# Patient Record
Sex: FEMALE | Race: WHITE | Employment: FULL TIME | URBAN - METROPOLITAN AREA
[De-identification: names, ages, dates, MRNs, and addresses within clinical notes are randomized per-mention and may not be internally consistent; named-entity substitution may affect disease eponyms.]

---

## 2023-07-06 DIAGNOSIS — I10 HYPERTENSION, UNSPECIFIED TYPE: ICD-10-CM

## 2023-07-06 DIAGNOSIS — E78.5 HYPERLIPIDEMIA, UNSPECIFIED HYPERLIPIDEMIA TYPE: ICD-10-CM

## 2023-07-06 RX ORDER — ATORVASTATIN CALCIUM 10 MG/1
TABLET, FILM COATED ORAL
COMMUNITY
End: 2023-07-06 | Stop reason: SDUPTHER

## 2023-07-06 RX ORDER — METOPROLOL SUCCINATE 100 MG/1
100 TABLET, EXTENDED RELEASE ORAL DAILY
Qty: 90 TABLET | Refills: 1 | Status: SHIPPED | OUTPATIENT
Start: 2023-07-06 | End: 2023-10-25 | Stop reason: SDUPTHER

## 2023-07-06 RX ORDER — ATORVASTATIN CALCIUM 10 MG/1
10 TABLET, FILM COATED ORAL DAILY
Qty: 90 TABLET | Refills: 1 | Status: SHIPPED | OUTPATIENT
Start: 2023-07-06 | End: 2023-10-25 | Stop reason: SDUPTHER

## 2023-07-06 RX ORDER — METOPROLOL SUCCINATE 100 MG/1
100 TABLET, EXTENDED RELEASE ORAL DAILY
COMMUNITY
End: 2023-07-06 | Stop reason: SDUPTHER

## 2023-10-25 ENCOUNTER — OFFICE VISIT (OUTPATIENT)
Dept: PRIMARY CARE | Facility: CLINIC | Age: 68
End: 2023-10-25
Payer: MEDICARE

## 2023-10-25 ENCOUNTER — LAB (OUTPATIENT)
Dept: LAB | Facility: LAB | Age: 68
End: 2023-10-25
Payer: MEDICARE

## 2023-10-25 VITALS
TEMPERATURE: 97.6 F | HEART RATE: 65 BPM | BODY MASS INDEX: 25.56 KG/M2 | DIASTOLIC BLOOD PRESSURE: 72 MMHG | OXYGEN SATURATION: 96 % | HEIGHT: 56 IN | WEIGHT: 113.6 LBS | SYSTOLIC BLOOD PRESSURE: 120 MMHG

## 2023-10-25 DIAGNOSIS — E78.2 MIXED HYPERLIPIDEMIA: ICD-10-CM

## 2023-10-25 DIAGNOSIS — Z00.00 HEALTH MAINTENANCE EXAMINATION: ICD-10-CM

## 2023-10-25 DIAGNOSIS — Z00.00 MEDICARE ANNUAL WELLNESS VISIT, SUBSEQUENT: Primary | ICD-10-CM

## 2023-10-25 DIAGNOSIS — Z78.0 ASYMPTOMATIC POSTMENOPAUSAL STATE: ICD-10-CM

## 2023-10-25 DIAGNOSIS — E78.5 HYPERLIPIDEMIA, UNSPECIFIED HYPERLIPIDEMIA TYPE: ICD-10-CM

## 2023-10-25 DIAGNOSIS — I10 PRIMARY HYPERTENSION: ICD-10-CM

## 2023-10-25 DIAGNOSIS — I10 BENIGN ESSENTIAL HYPERTENSION: ICD-10-CM

## 2023-10-25 DIAGNOSIS — F17.210 CIGARETTE NICOTINE DEPENDENCE WITHOUT COMPLICATION: ICD-10-CM

## 2023-10-25 LAB
ALBUMIN SERPL BCP-MCNC: 4.4 G/DL (ref 3.4–5)
ALP SERPL-CCNC: 90 U/L (ref 33–136)
ALT SERPL W P-5'-P-CCNC: 15 U/L (ref 7–45)
ANION GAP SERPL CALC-SCNC: 13 MMOL/L (ref 10–20)
AST SERPL W P-5'-P-CCNC: 19 U/L (ref 9–39)
BILIRUB SERPL-MCNC: 0.6 MG/DL (ref 0–1.2)
BUN SERPL-MCNC: 11 MG/DL (ref 6–23)
CALCIUM SERPL-MCNC: 9.6 MG/DL (ref 8.6–10.3)
CHLORIDE SERPL-SCNC: 102 MMOL/L (ref 98–107)
CHOLEST SERPL-MCNC: 160 MG/DL (ref 0–199)
CHOLESTEROL/HDL RATIO: 3.1
CO2 SERPL-SCNC: 26 MMOL/L (ref 21–32)
CREAT SERPL-MCNC: 0.64 MG/DL (ref 0.5–1.05)
ERYTHROCYTE [DISTWIDTH] IN BLOOD BY AUTOMATED COUNT: 13.3 % (ref 11.5–14.5)
GFR SERPL CREATININE-BSD FRML MDRD: >90 ML/MIN/1.73M*2
GLUCOSE SERPL-MCNC: 89 MG/DL (ref 74–99)
HCT VFR BLD AUTO: 43.4 % (ref 36–46)
HDLC SERPL-MCNC: 52.1 MG/DL
HGB BLD-MCNC: 14.1 G/DL (ref 12–16)
LDLC SERPL CALC-MCNC: 86 MG/DL
MCH RBC QN AUTO: 29.4 PG (ref 26–34)
MCHC RBC AUTO-ENTMCNC: 32.5 G/DL (ref 32–36)
MCV RBC AUTO: 91 FL (ref 80–100)
NON HDL CHOLESTEROL: 108 MG/DL (ref 0–149)
NRBC BLD-RTO: 0 /100 WBCS (ref 0–0)
PLATELET # BLD AUTO: 383 X10*3/UL (ref 150–450)
PMV BLD AUTO: 9.3 FL (ref 7.5–11.5)
POTASSIUM SERPL-SCNC: 4.6 MMOL/L (ref 3.5–5.3)
PROT SERPL-MCNC: 6.7 G/DL (ref 6.4–8.2)
RBC # BLD AUTO: 4.79 X10*6/UL (ref 4–5.2)
SODIUM SERPL-SCNC: 136 MMOL/L (ref 136–145)
TRIGL SERPL-MCNC: 112 MG/DL (ref 0–149)
VLDL: 22 MG/DL (ref 0–40)
WBC # BLD AUTO: 11.5 X10*3/UL (ref 4.4–11.3)

## 2023-10-25 PROCEDURE — 36415 COLL VENOUS BLD VENIPUNCTURE: CPT

## 2023-10-25 PROCEDURE — 1159F MED LIST DOCD IN RCRD: CPT | Performed by: STUDENT IN AN ORGANIZED HEALTH CARE EDUCATION/TRAINING PROGRAM

## 2023-10-25 PROCEDURE — 80061 LIPID PANEL: CPT

## 2023-10-25 PROCEDURE — 99213 OFFICE O/P EST LOW 20 MIN: CPT | Performed by: STUDENT IN AN ORGANIZED HEALTH CARE EDUCATION/TRAINING PROGRAM

## 2023-10-25 PROCEDURE — 1160F RVW MEDS BY RX/DR IN RCRD: CPT | Performed by: STUDENT IN AN ORGANIZED HEALTH CARE EDUCATION/TRAINING PROGRAM

## 2023-10-25 PROCEDURE — G0439 PPPS, SUBSEQ VISIT: HCPCS | Performed by: STUDENT IN AN ORGANIZED HEALTH CARE EDUCATION/TRAINING PROGRAM

## 2023-10-25 PROCEDURE — 85027 COMPLETE CBC AUTOMATED: CPT

## 2023-10-25 PROCEDURE — 80053 COMPREHEN METABOLIC PANEL: CPT

## 2023-10-25 PROCEDURE — 1170F FXNL STATUS ASSESSED: CPT | Performed by: STUDENT IN AN ORGANIZED HEALTH CARE EDUCATION/TRAINING PROGRAM

## 2023-10-25 PROCEDURE — 99397 PER PM REEVAL EST PAT 65+ YR: CPT | Performed by: STUDENT IN AN ORGANIZED HEALTH CARE EDUCATION/TRAINING PROGRAM

## 2023-10-25 PROCEDURE — 4004F PT TOBACCO SCREEN RCVD TLK: CPT | Performed by: STUDENT IN AN ORGANIZED HEALTH CARE EDUCATION/TRAINING PROGRAM

## 2023-10-25 PROCEDURE — 3074F SYST BP LT 130 MM HG: CPT | Performed by: STUDENT IN AN ORGANIZED HEALTH CARE EDUCATION/TRAINING PROGRAM

## 2023-10-25 PROCEDURE — 1126F AMNT PAIN NOTED NONE PRSNT: CPT | Performed by: STUDENT IN AN ORGANIZED HEALTH CARE EDUCATION/TRAINING PROGRAM

## 2023-10-25 PROCEDURE — 99406 BEHAV CHNG SMOKING 3-10 MIN: CPT | Performed by: STUDENT IN AN ORGANIZED HEALTH CARE EDUCATION/TRAINING PROGRAM

## 2023-10-25 PROCEDURE — 3078F DIAST BP <80 MM HG: CPT | Performed by: STUDENT IN AN ORGANIZED HEALTH CARE EDUCATION/TRAINING PROGRAM

## 2023-10-25 RX ORDER — METOPROLOL SUCCINATE 100 MG/1
100 TABLET, EXTENDED RELEASE ORAL DAILY
Qty: 90 TABLET | Refills: 1 | Status: SHIPPED | OUTPATIENT
Start: 2023-10-25 | End: 2024-04-22

## 2023-10-25 RX ORDER — ATORVASTATIN CALCIUM 10 MG/1
10 TABLET, FILM COATED ORAL DAILY
Qty: 90 TABLET | Refills: 3 | Status: SHIPPED | OUTPATIENT
Start: 2023-10-25

## 2023-10-25 ASSESSMENT — ACTIVITIES OF DAILY LIVING (ADL)
DOING_HOUSEWORK: INDEPENDENT
GROCERY_SHOPPING: INDEPENDENT
MANAGING_FINANCES: INDEPENDENT
BATHING: INDEPENDENT
TAKING_MEDICATION: INDEPENDENT
DRESSING: INDEPENDENT

## 2023-10-25 ASSESSMENT — ENCOUNTER SYMPTOMS
ABDOMINAL PAIN: 0
DIARRHEA: 0
DYSPHORIC MOOD: 0
FREQUENCY: 0
HEADACHES: 0
NAUSEA: 0
CONSTIPATION: 0
WHEEZING: 0
SHORTNESS OF BREATH: 0
PALPITATIONS: 0
COUGH: 0
FEVER: 0
DYSURIA: 0
FATIGUE: 0
NUMBNESS: 0
DIZZINESS: 0
CHILLS: 0
NERVOUS/ANXIOUS: 0
HEMATURIA: 0

## 2023-10-25 ASSESSMENT — PATIENT HEALTH QUESTIONNAIRE - PHQ9
SUM OF ALL RESPONSES TO PHQ9 QUESTIONS 1 AND 2: 0
2. FEELING DOWN, DEPRESSED OR HOPELESS: NOT AT ALL
1. LITTLE INTEREST OR PLEASURE IN DOING THINGS: NOT AT ALL

## 2023-10-25 NOTE — PATIENT INSTRUCTIONS
Recommendations for women annual wellness exam:   Make sure screenings for cervical, breast, and colon cancer are up to date if applicable- pap smears age 21-65, discuss mammogram starting at age 40, colonoscopy at age 45, earlier if positive family history for breast or colon cancer   Screen for osteoporosis with DXA bone scan starting at age 65 or sooner if risk factors present (long term steroid use, smoking, heavy alcohol use, history of fracture, rheumatoid arthritis, low body weight, family history of hip fracture)  Screening for lung cancer with low-dose CT in all adults age 55-77 who have a 30 pack-year smoking history and currently smoke or have quit within the past 15 years  Follow a healthy diet (Dash diet, Mediterranean diet)  Exercise 150 min/wk   Maintain healthy weight (BMI < 25)   Do not smoke   Alcohol in moderation (up to 1 drink/day)  Get enough sleep (7-8 hours/night)  Make sure immunizations are up to date (influenza, pneumococcal, Tdap, covid, shingles if age > 50, RSV if >60)  Postmenopausal women or women with osteoporosis need minimum 1,200 mg calcium and 800 IU vitamin D daily  Talk to your physician if you have concerns about depression or anxiety  Visit dentist twice yearly    PLAN TO QUIT SMOKING:     When you are ready -   Pick a date to quit.  On your quit date, get rid of everything that has to do with smoking.   Avoid social settings where you may be around smokers.  Get your family and friends' support.  Call the [place] Tobacco Quit Line at 1-680.221.3044 for help and guidance.  You can quit smoking if you really want to.    Tobacco (Nicotine) Addiction     Why does it seem so hard to stop smoking?  Smoking causes changes in your body and in the way you act. The changes in your body are caused by an addiction to nicotine. The changes in the way you act developed over time as you bought cigarettes, lit them and smoked them. These changes have become your smoking habit.    When you have  a smoking habit, many things seem to go along with having a cigarette. These might include having a cup of coffee or an alcoholic drink, being stressed or worried, talking on the phone, driving, socializing with friends or wanting something to do with your hands.  What's in cigarettes?  Cigarettes contain substances that you would never think about putting in your body. For example, cigarettes contain tar, carbon monoxide and chemicals like DDT, arsenic and formaldehyde (a gas used to preserve dead animals).    The tobacco in cigarettes (OR VAP PRODUCTS!) also contains nicotine--the drug that makes smoking addictive. All of these things are bad for your body. Nicotine raises your risk of heart attack and stroke. Tar and carbon monoxide cause serious breathing problems. And you know tobacco smoke causes cancer.  Smoking can shorten your life by as much as 14 years. Smoking can cause many diseases, including lung cancer, mouth cancers and heart disease. It can also cause a cough that won't go away, and it may make it hard for you to breathe.    Even a few cigarettes a day are bad for your health. Once you start smoking, it can be very hard to stop. The nicotine in cigarettes is poisonous and very addictive. Once you start using it, your body will feel like it cannot function without it. Most adult smokers started when they were teenagers, and later realized that they couldn't stop smoking.    Both cigarettes and chewing tobacco are toxic to your body. You may hear more about the harm cigarettes do to the body, but chewing tobacco can also hurt your health. Chewing tobacco can cause sores and white patches in your mouth, as well as diseases and cancers of the mouth, gums and throat. Chewing can give you bad breath, discolor your teeth and cause tooth loss. And one chew contains 15 times the nicotine of a cigarette (meaning the risk of addiction is much higher).    How can I stop smoking?  You'll have the best chance of  stopping if you do the following:  Get ready.   Get support and encouragement.   Learn how to handle stress and the urge to smoke.   Get medication and use it correctly.   Be prepared for relapse.   Keep trying.    Pick a stop date. Choose a date 2 to 4 weeks from today so you can get ready to quit. If possible, choose a time when things in your life will change, like when you're about to start a break from school. Or just pick a time when you don't expect any extra stress at school, work or home. For example, quit after final exams, not during them.   Make a list of the reasons why you want to quit. Keep the list on hand so you can look at it when you have a nicotine craving.   Keep track of where, when and why you smoke. You may want to make notes for a week or so to know ahead of time when and why you crave a cigarette. Plan what you'll do instead of smoking (see list above for ideas). You may also want to plan what you'll say to people who pressure you to smoke.   Throw away all of your tobacco. Clean out your room if you have smoked there. Throw away your ashtrays and lighters--anything that you connect with your smoking habit.   Tell your friends that you're quitting. Ask them not to pressure you about smoking. Find other things to do with them besides smoking.   When your stop date arrives, STOP. Plan little rewards for yourself for each tobacco-free day, week or month. For example, buy yourself a new shirt or ask a friend to see a movie with you.  What about nicotine replacement products or medicine to help me stop smoking?  Nicotine replacement products are ways to take in nicotine without smoking. These products come in several forms: gum, patch, nasal spray, inhaler and lozenge. You can buy the nicotine gum, patch and lozenge without a prescription from me. Nicotine replacement works by lessening your body's craving for nicotine and reducing withdrawal symptoms. This lets you focus on the changes you need  "to make in your habits and environment. Once you feel more confident as a nonsmoker, dealing with your nicotine addiction is easier.    Prescription medicines such as bupropion and varenicline help some people stop smoking. These medicines do not contain nicotine, but help you resist your urges to smoke.    Talk to me about which of these products is likely to give you the best chance of success. For any of these products to work, you must carefully follow the directions on the package. It's very important that you don't smoke while using nicotine replacement products.  How can I get support and encouragement?  Tell your family and friends what kind of help you need. Their support will make it easier for you to stop smoking. Also, ask your family doctor to help you develop a plan for stopping smoking. He or she can give you information on telephone hotlines, such as 3-733-QUIT-NOW ([phone]), or self-help materials that can be very helpful. Your doctor can also recommend a stop-smoking program. These programs are often held at local hospitals or health centers.    Give yourself rewards for stopping smoking. For example, with the money you save by not smoking, buy yourself something special.  Remember, you will need some help to stop smoking. Nine out of 10 smokers who try to go \"cold turkey\" fail because nicotine is so addictive. But it is easy to find help to quit <http://www.cdc.gov/tobacco/campaign/tips/>.  What about stress and my urges to smoke?  You may have a habit of using cigarettes to relax during stressful times. Luckily, there are good ways to manage stress without smoking. Relax by taking a hot bath, going for a walk, or breathing slowly and deeply. Think of changes in your daily routine that will help you resist the urge to smoke. For example, if you used to smoke when you drank coffee, drink hot tea instead.  How will I feel when I quit?  You may feel edgy and irritable. You also may get angry or " upset faster, have trouble concentrating and feel hungrier than usual. You may have headaches and cough more at first (while your lungs are clearing out). All of these can be symptoms of withdrawal from nicotine. Keep in mind that the worst symptoms will be over in a few days. However, you may still have cravings for tobacco. Those cravings have less to do with nicotine addiction and more to do with the habit of smoking.  Will I gain weight when I quit?  Some people gain a few pounds. Other people lose weight. The main reason some people gain weight is because they eat more food as a substitute for smoking. You can avoid gaining weight by watching how much you eat, staying busy and working out.  What if I can't quit?  You can quit. Most people try to quit more than once before they succeed. So don't give up if you slip. Remind yourself of why you want to quit. Think about what happened to make you slip. Figure out how you'll handle that situation differently next time. Then recommit\

## 2023-10-25 NOTE — PROGRESS NOTES
Subjective   Reason for Visit: Milan Xavier is an 68 y.o. female here for a Medicare Wellness visit.     Reviewed all medications by prescribing practitioner or clinical pharmacist (such as prescriptions, OTCs, herbal therapies and supplements) and documented in the medical record.    HPI  Specialists seen by patient: eye doctor  -dentist  -derm  -Breast specialist orders mammo for her, has had several lumps    Last pap/cervical cancer screening: n/a  Last mammogram: approximate date 10/22 and was abnormal: US 11/22 showed cyst and new one ordered by breast  Hx of colon ca screening: yes, c-scope 2016 repeat in 2026  Hx of DXA: no, will order  History of depression or anxiety:no  Immunizations: not up to date - declines pneumonia, declines tdap, will get shingrix, declines rsv, declines flu vaccine  Diet: Follows a healthy diet  Exercise: Gets regular exercise, walks before work daily   Alcohol abuse screen:   none   How many times in the past year 4 or more drinks in a day?  Lung cancer screening:   Smoking history: current smoker  Drug use: No    Patient Self Assessment of Health Status  Patient Self Assessment: Good  Functional Ability/Level of Safety  Cognitive Impairment Observed: No cognitive impairment observed  Falls Home Safety Risk Factors  Home Safety Risk Factors: Loose rugs, No grab bars, bathroom  Nutrition and Exercise  Current Diet: Well Balanced Diet  Adequate Fluid Intake: Yes  Caffeine: No  Exercise Frequency: Regularly  ADL Screening  Hearing - Right Ear: Functional  Hearing - Left Ear: Functional  Bathing: Independent  Dressing: Independent  Walks in Home: Independent  IADL's  Managing Finances: Independent  Grocery Shopping: Independent  Taking Medication: Independent  Doing Housework: Independent    BP running good, stable on metoprolol. Pulse 65 today    Patient Care Team:  Petra Leung DO as PCP - General (Family Medicine)  Davon Proctor MD as PCP - [employer]  "PCP     Review of Systems   Constitutional:  Negative for chills, fatigue and fever.   Respiratory:  Negative for cough, shortness of breath and wheezing.    Cardiovascular:  Negative for chest pain, palpitations and leg swelling.   Gastrointestinal:  Negative for abdominal pain, constipation, diarrhea and nausea.   Genitourinary:  Negative for dysuria, frequency, hematuria and urgency.   Neurological:  Negative for dizziness, numbness and headaches.   Psychiatric/Behavioral:  Negative for dysphoric mood. The patient is not nervous/anxious.        Objective   Vitals:  /72 (BP Location: Left arm, Patient Position: Sitting, BP Cuff Size: Adult)   Pulse 65   Temp 36.4 °C (97.6 °F) (Temporal)   Ht 1.41 m (4' 7.5\")   Wt 51.5 kg (113 lb 9.6 oz)   SpO2 96%   BMI 25.93 kg/m²       Physical Exam  Constitutional:       General: She is not in acute distress.     Appearance: Normal appearance.   HENT:      Head: Normocephalic and atraumatic.      Right Ear: Tympanic membrane and ear canal normal.      Left Ear: Tympanic membrane and ear canal normal.      Mouth/Throat:      Mouth: Mucous membranes are moist.      Pharynx: No posterior oropharyngeal erythema.   Eyes:      Extraocular Movements: Extraocular movements intact.      Pupils: Pupils are equal, round, and reactive to light.   Cardiovascular:      Rate and Rhythm: Normal rate and regular rhythm.      Heart sounds: No murmur heard.  Pulmonary:      Effort: Pulmonary effort is normal. No respiratory distress.      Breath sounds: Normal breath sounds. No wheezing.   Abdominal:      General: Bowel sounds are normal.      Palpations: Abdomen is soft.      Tenderness: There is no abdominal tenderness. There is no guarding.   Musculoskeletal:         General: Normal range of motion.      Cervical back: Neck supple.   Skin:     General: Skin is warm and dry.   Neurological:      General: No focal deficit present.      Mental Status: She is alert and oriented to " person, place, and time.   Psychiatric:         Mood and Affect: Mood normal.         Behavior: Behavior normal.         Assessment/Plan   Problem List Items Addressed This Visit       Benign essential hypertension    Hyperlipidemia    Relevant Medications    atorvastatin (Lipitor) 10 mg tablet    Other Relevant Orders    Lipid Panel    Comprehensive Metabolic Panel    CBC     Other Visit Diagnoses       Medicare annual wellness visit, subsequent    -  Primary    Health maintenance examination        Asymptomatic postmenopausal state        Relevant Orders    XR DEXA bone density    Hypertension, unspecified type        Relevant Medications    metoprolol succinate XL (Toprol-XL) 100 mg 24 hr tablet          We will obtain fasting blood work.  Results will be communicated to the patient via MyChart or a letter.   We reviewed appropriate preventative health screening guidelines. The patient is not up-to-date on vaccinations, recommended covid vaccine, tetanus vaccine, influenza vaccine, pneumonia vaccine, and shingles vaccines.  The patient had colonoscopy in 2016.  They were advised to repeat screening in 2026.  Colon cancer screening ordered today: No.  Mammogram up-to-date: Yes.  Mammogram ordered today: No.  We discussed regular aerobic exercise. We discussed proper nutrition and weight control.    Medications refilled and will f/up in 6 months for BP    I spent more than 3 minutes (4-10 minutes) counseling the patient about need for smoking/tobacco cessation and how I can support efforts when patient is ready to quit.  Discussed nicotine replacement therapy, wellbutrin, chantix, hypnosis, support groups, and acupuncture as potential options.      Patient thinking about quitting at this time.  Patient currently has no signs or symptoms of tobacco related disease.

## 2023-11-01 ENCOUNTER — ANCILLARY PROCEDURE (OUTPATIENT)
Dept: RADIOLOGY | Facility: CLINIC | Age: 68
End: 2023-11-01
Payer: MEDICARE

## 2023-11-01 DIAGNOSIS — Z78.0 ASYMPTOMATIC POSTMENOPAUSAL STATE: ICD-10-CM

## 2023-11-01 PROCEDURE — 77080 DXA BONE DENSITY AXIAL: CPT | Performed by: RADIOLOGY

## 2023-11-01 PROCEDURE — 77080 DXA BONE DENSITY AXIAL: CPT

## 2023-11-06 ENCOUNTER — ANCILLARY PROCEDURE (OUTPATIENT)
Dept: RADIOLOGY | Facility: CLINIC | Age: 68
End: 2023-11-06
Payer: MEDICARE

## 2023-11-06 DIAGNOSIS — Z12.31 ENCOUNTER FOR SCREENING MAMMOGRAM FOR MALIGNANT NEOPLASM OF BREAST: ICD-10-CM

## 2023-11-06 PROCEDURE — 77067 SCR MAMMO BI INCL CAD: CPT | Performed by: RADIOLOGY

## 2023-11-06 PROCEDURE — 77067 SCR MAMMO BI INCL CAD: CPT

## 2023-11-06 PROCEDURE — 77063 BREAST TOMOSYNTHESIS BI: CPT | Performed by: RADIOLOGY

## 2023-11-09 ENCOUNTER — TELEPHONE (OUTPATIENT)
Dept: PRIMARY CARE | Facility: CLINIC | Age: 68
End: 2023-11-09
Payer: MEDICARE

## 2023-11-28 ENCOUNTER — TELEPHONE (OUTPATIENT)
Dept: PRIMARY CARE | Facility: CLINIC | Age: 68
End: 2023-11-28
Payer: MEDICARE

## 2023-11-28 DIAGNOSIS — B37.31 YEAST VAGINITIS: Primary | ICD-10-CM

## 2023-11-28 NOTE — TELEPHONE ENCOUNTER
Patient has a yeast infection.  She has fluconazone from 2 years ago and wants to know if she can take that or if you can call in a new script for her.  Please advise

## 2023-11-29 RX ORDER — FLUCONAZOLE 150 MG/1
150 TABLET ORAL ONCE
Qty: 1 TABLET | Refills: 0 | Status: SHIPPED | OUTPATIENT
Start: 2023-11-29 | End: 2023-11-29

## 2024-01-25 ENCOUNTER — APPOINTMENT (OUTPATIENT)
Dept: PRIMARY CARE | Facility: CLINIC | Age: 69
End: 2024-01-25
Payer: MEDICARE

## 2024-04-30 ENCOUNTER — APPOINTMENT (OUTPATIENT)
Dept: PRIMARY CARE | Facility: CLINIC | Age: 69
End: 2024-04-30
Payer: MEDICARE

## 2024-07-01 ENCOUNTER — TELEPHONE (OUTPATIENT)
Dept: PRIMARY CARE | Facility: CLINIC | Age: 69
End: 2024-07-01
Payer: MEDICARE

## 2024-07-01 DIAGNOSIS — I10 PRIMARY HYPERTENSION: ICD-10-CM

## 2024-07-01 RX ORDER — METOPROLOL SUCCINATE 100 MG/1
100 TABLET, EXTENDED RELEASE ORAL DAILY
Qty: 90 TABLET | Refills: 1 | Status: SHIPPED | OUTPATIENT
Start: 2024-07-01 | End: 2024-12-28

## 2024-09-27 DIAGNOSIS — E78.5 HYPERLIPIDEMIA, UNSPECIFIED HYPERLIPIDEMIA TYPE: ICD-10-CM

## 2024-09-28 RX ORDER — ATORVASTATIN CALCIUM 10 MG/1
10 TABLET, FILM COATED ORAL DAILY
Qty: 30 TABLET | Refills: 0 | Status: SHIPPED | OUTPATIENT
Start: 2024-09-28

## 2024-10-25 ENCOUNTER — APPOINTMENT (OUTPATIENT)
Dept: PRIMARY CARE | Facility: CLINIC | Age: 69
End: 2024-10-25
Payer: MEDICARE

## 2024-11-06 ENCOUNTER — HOSPITAL ENCOUNTER (OUTPATIENT)
Dept: RADIOLOGY | Facility: CLINIC | Age: 69
Discharge: HOME | End: 2024-11-06
Payer: MEDICARE

## 2024-11-06 VITALS — BODY MASS INDEX: 24.38 KG/M2 | HEIGHT: 57 IN | WEIGHT: 113 LBS

## 2024-11-06 DIAGNOSIS — Z12.31 ENCOUNTER FOR SCREENING MAMMOGRAM FOR MALIGNANT NEOPLASM OF BREAST: ICD-10-CM

## 2024-11-06 PROCEDURE — 77067 SCR MAMMO BI INCL CAD: CPT | Performed by: RADIOLOGY

## 2024-11-06 PROCEDURE — 77063 BREAST TOMOSYNTHESIS BI: CPT | Performed by: RADIOLOGY

## 2024-11-06 PROCEDURE — 77067 SCR MAMMO BI INCL CAD: CPT

## 2025-01-01 DIAGNOSIS — I10 PRIMARY HYPERTENSION: ICD-10-CM

## 2025-01-02 ENCOUNTER — APPOINTMENT (OUTPATIENT)
Dept: PRIMARY CARE | Facility: CLINIC | Age: 70
End: 2025-01-02
Payer: MEDICARE

## 2025-01-02 RX ORDER — METOPROLOL SUCCINATE 100 MG/1
100 TABLET, EXTENDED RELEASE ORAL DAILY
Qty: 90 TABLET | Refills: 0 | Status: SHIPPED | OUTPATIENT
Start: 2025-01-02 | End: 2025-01-03 | Stop reason: SDUPTHER

## 2025-01-03 DIAGNOSIS — I10 PRIMARY HYPERTENSION: ICD-10-CM

## 2025-01-03 RX ORDER — METOPROLOL SUCCINATE 100 MG/1
100 TABLET, EXTENDED RELEASE ORAL DAILY
Qty: 30 TABLET | Refills: 1 | Status: SHIPPED | OUTPATIENT
Start: 2025-01-03 | End: 2025-03-04

## 2025-01-03 NOTE — TELEPHONE ENCOUNTER
Refill for Metoprolol Succ 100 mg 1 daily sent to Dr Leung to review and send to pharmacy  [address]25    [facility] [phone]

## 2025-01-10 DIAGNOSIS — E78.5 HYPERLIPIDEMIA, UNSPECIFIED HYPERLIPIDEMIA TYPE: ICD-10-CM

## 2025-01-10 RX ORDER — ATORVASTATIN CALCIUM 10 MG/1
10 TABLET, FILM COATED ORAL DAILY
Qty: 90 TABLET | Refills: 0 | Status: SHIPPED | OUTPATIENT
Start: 2025-01-10

## 2025-02-19 ASSESSMENT — ENCOUNTER SYMPTOMS
FATIGUE: 0
COUGH: 0
NUMBNESS: 0
HEADACHES: 0
SHORTNESS OF BREATH: 0
HEMATURIA: 0
DIARRHEA: 0
NERVOUS/ANXIOUS: 0
ABDOMINAL PAIN: 0
NAUSEA: 0
CHILLS: 0
DIZZINESS: 0
DYSPHORIC MOOD: 0
CONSTIPATION: 0
DYSURIA: 0
FREQUENCY: 0
WHEEZING: 0
PALPITATIONS: 0
FEVER: 0

## 2025-02-19 NOTE — PROGRESS NOTES
Subjective   Reason for Visit: Milan Xavier is an 69 y.o. female here for a Medicare Wellness visit.     Reviewed all medications by prescribing practitioner or clinical pharmacist (such as prescriptions, OTCs, herbal therapies and supplements) and documented in the medical record.    HPI  Specialists seen by patient: eye doctor  -dentist  -derm  -Breast specialist orders mammo for her, has had several lumps    Last pap/cervical cancer screening: n/a  Last mammogram: 2024, ordered by breast specialist  Hx of colon ca screening: yes, c-scope 2016 repeat in 2026  Hx of DXA: November 2023.  Osteoporosis in femoral neck.  Currently not on any medications  History of depression or anxiety:no  Immunizations: not up to date - declines pneumonia, declines tdap, will get shingrix, declines rsv, declines flu vaccine  Diet: Follows a healthy diet  Exercise: Gets regular exercise, walks before work daily   Alcohol abuse screen:   none   How many times in the past year 4 or more drinks in a day?  Lung cancer screening:   Smoking history: current smoker  Drug use: No  PHQ-9: 1  HCPOA: yes    Is due for fasting blood work    About a week ago she turned and felt a pop in her L knee. Since then it has been hurting. Hurts to fully extend. No previous injury. Hurts to walk. No locking. Feels like something is catching. No instability. She has been putting salon pas and a knee brace.    Patient Self Assessment of Health Status  Patient Self Assessment: Good  Functional Ability/Level of Safety  Cognitive Impairment Observed: No cognitive impairment observed  Falls Home Safety Risk Factors  Home Safety Risk Factors: Loose rugs  Nutrition and Exercise  Current Diet: Well Balanced Diet  Adequate Fluid Intake: Yes  Caffeine: No  Exercise Frequency: Regularly  ADL Screening  Hearing - Right Ear: Functional  Hearing - Left Ear: Functional  Bathing: Independent  Dressing: Independent  Walks in Home: Independent  IADL's  Managing Finances:  "Independent  Grocery Shopping: Independent  Taking Medication: Independent  Doing Housework: Independent    BP running good, stable on metoprolol. Pulse 65 today    Patient Care Team:  Petra Leung DO as PCP - General (Family Medicine)  Petra Leung DO as PCP - [employer] PCP     Review of Systems   Constitutional:  Negative for chills, fatigue and fever.   Respiratory:  Negative for cough, shortness of breath and wheezing.    Cardiovascular:  Negative for chest pain, palpitations and leg swelling.   Gastrointestinal:  Negative for abdominal pain, constipation, diarrhea and nausea.   Genitourinary:  Negative for dysuria, frequency, hematuria and urgency.   Neurological:  Negative for dizziness, numbness and headaches.   Psychiatric/Behavioral:  Negative for dysphoric mood. The patient is not nervous/anxious.        Objective   Vitals:  /80 (BP Location: Right arm, Patient Position: Sitting, BP Cuff Size: Adult)   Temp 36.3 °C (97.3 °F) (Temporal)   Ht 1.41 m (4' 7.51\")   Wt 53.7 kg (118 lb 6.4 oz)   LMP  (LMP Unknown)   BMI 27.01 kg/m²       Physical Exam  Constitutional:       General: She is not in acute distress.     Appearance: Normal appearance.   HENT:      Head: Normocephalic and atraumatic.      Right Ear: Tympanic membrane and ear canal normal.      Left Ear: Tympanic membrane and ear canal normal.      Mouth/Throat:      Mouth: Mucous membranes are moist.      Pharynx: No posterior oropharyngeal erythema.   Eyes:      Extraocular Movements: Extraocular movements intact.      Pupils: Pupils are equal, round, and reactive to light.   Cardiovascular:      Rate and Rhythm: Normal rate and regular rhythm.      Heart sounds: No murmur heard.  Pulmonary:      Effort: Pulmonary effort is normal. No respiratory distress.      Breath sounds: Normal breath sounds. No wheezing.   Abdominal:      General: Bowel sounds are normal.      Palpations: Abdomen is soft.      Tenderness: " There is no abdominal tenderness. There is no guarding.   Musculoskeletal:      Cervical back: Neck supple.      Left knee:      Instability Tests: Medial Jeff test positive and lateral Jeff test positive.   Skin:     General: Skin is warm and dry.   Neurological:      General: No focal deficit present.      Mental Status: She is alert and oriented to person, place, and time.   Psychiatric:         Mood and Affect: Mood normal.         Behavior: Behavior normal.     Right Knee Exam   Right knee exam is normal.      Left Knee Exam     Tenderness   The patient is experiencing tenderness in the medial joint line and patellar tendon.    Range of Motion   Extension:  abnormal   Flexion:  normal     Tests   Jeff:  Medial - positive Lateral - positive  Varus: negative Valgus: negative  Lachman:  Anterior - negative      Drawer:  Anterior - negative     Posterior - negative    Other   Sensation: normal  Swelling: mild              Assessment/Plan   Problem List Items Addressed This Visit    None  Visit Diagnoses       Medicare annual wellness visit, subsequent    -  Primary    Health maintenance examination        Acute pain of left knee        Relevant Orders    XR knee left 4+ views    Referral to Physical Therapy    Primary hypertension        Relevant Medications    metoprolol succinate XL (Toprol-XL) 100 mg 24 hr tablet    Other Relevant Orders    Lipid Panel    Comprehensive Metabolic Panel    CBC            We will obtain fasting blood work.  Results will be communicated to the patient via MyChart or a letter.   We reviewed appropriate preventative health screening guidelines. We discussed regular aerobic exercise. We discussed proper nutrition and weight control.    Blood pressure well-controlled, will continue current medications.    It does sound like she may have a medial meniscus tear.  Will check x-rays and send her to PT.  Will hold off on an MRI at this time.  Will see her back in 3 months    I  spent more than 3 minutes (4-10 minutes) counseling the patient about need for smoking/tobacco cessation and how I can support efforts when patient is ready to quit.  Discussed nicotine replacement therapy, wellbutrin, chantix, hypnosis, support groups, and acupuncture as potential options.      Patient thinking about quitting at this time.  Patient currently has no signs or symptoms of tobacco related disease.

## 2025-02-20 ENCOUNTER — HOSPITAL ENCOUNTER (OUTPATIENT)
Dept: RADIOLOGY | Facility: CLINIC | Age: 70
Discharge: HOME | End: 2025-02-20
Payer: MEDICARE

## 2025-02-20 ENCOUNTER — APPOINTMENT (OUTPATIENT)
Dept: PRIMARY CARE | Facility: CLINIC | Age: 70
End: 2025-02-20
Payer: MEDICARE

## 2025-02-20 VITALS
TEMPERATURE: 97.3 F | WEIGHT: 118.4 LBS | DIASTOLIC BLOOD PRESSURE: 80 MMHG | SYSTOLIC BLOOD PRESSURE: 126 MMHG | BODY MASS INDEX: 26.64 KG/M2 | HEIGHT: 56 IN

## 2025-02-20 DIAGNOSIS — I10 PRIMARY HYPERTENSION: ICD-10-CM

## 2025-02-20 DIAGNOSIS — F17.210 CIGARETTE NICOTINE DEPENDENCE WITHOUT COMPLICATION: ICD-10-CM

## 2025-02-20 DIAGNOSIS — Z00.00 HEALTH MAINTENANCE EXAMINATION: ICD-10-CM

## 2025-02-20 DIAGNOSIS — M25.562 ACUTE PAIN OF LEFT KNEE: ICD-10-CM

## 2025-02-20 DIAGNOSIS — Z00.00 MEDICARE ANNUAL WELLNESS VISIT, SUBSEQUENT: Primary | ICD-10-CM

## 2025-02-20 DIAGNOSIS — Z13.31 DEPRESSION SCREENING: ICD-10-CM

## 2025-02-20 PROCEDURE — 73564 X-RAY EXAM KNEE 4 OR MORE: CPT | Mod: LT

## 2025-02-20 PROCEDURE — 1159F MED LIST DOCD IN RCRD: CPT | Performed by: STUDENT IN AN ORGANIZED HEALTH CARE EDUCATION/TRAINING PROGRAM

## 2025-02-20 PROCEDURE — G0439 PPPS, SUBSEQ VISIT: HCPCS | Performed by: STUDENT IN AN ORGANIZED HEALTH CARE EDUCATION/TRAINING PROGRAM

## 2025-02-20 PROCEDURE — 99214 OFFICE O/P EST MOD 30 MIN: CPT | Performed by: STUDENT IN AN ORGANIZED HEALTH CARE EDUCATION/TRAINING PROGRAM

## 2025-02-20 PROCEDURE — G0444 DEPRESSION SCREEN ANNUAL: HCPCS | Performed by: STUDENT IN AN ORGANIZED HEALTH CARE EDUCATION/TRAINING PROGRAM

## 2025-02-20 PROCEDURE — 3008F BODY MASS INDEX DOCD: CPT | Performed by: STUDENT IN AN ORGANIZED HEALTH CARE EDUCATION/TRAINING PROGRAM

## 2025-02-20 PROCEDURE — 99397 PER PM REEVAL EST PAT 65+ YR: CPT | Performed by: STUDENT IN AN ORGANIZED HEALTH CARE EDUCATION/TRAINING PROGRAM

## 2025-02-20 PROCEDURE — 3079F DIAST BP 80-89 MM HG: CPT | Performed by: STUDENT IN AN ORGANIZED HEALTH CARE EDUCATION/TRAINING PROGRAM

## 2025-02-20 PROCEDURE — 3074F SYST BP LT 130 MM HG: CPT | Performed by: STUDENT IN AN ORGANIZED HEALTH CARE EDUCATION/TRAINING PROGRAM

## 2025-02-20 PROCEDURE — 99406 BEHAV CHNG SMOKING 3-10 MIN: CPT | Performed by: STUDENT IN AN ORGANIZED HEALTH CARE EDUCATION/TRAINING PROGRAM

## 2025-02-20 PROCEDURE — 1160F RVW MEDS BY RX/DR IN RCRD: CPT | Performed by: STUDENT IN AN ORGANIZED HEALTH CARE EDUCATION/TRAINING PROGRAM

## 2025-02-20 PROCEDURE — 1170F FXNL STATUS ASSESSED: CPT | Performed by: STUDENT IN AN ORGANIZED HEALTH CARE EDUCATION/TRAINING PROGRAM

## 2025-02-20 PROCEDURE — 1124F ACP DISCUSS-NO DSCNMKR DOCD: CPT | Performed by: STUDENT IN AN ORGANIZED HEALTH CARE EDUCATION/TRAINING PROGRAM

## 2025-02-20 RX ORDER — MULTIVITAMIN WITH IRON
1 TABLET ORAL DAILY
COMMUNITY
Start: 2020-08-19

## 2025-02-20 RX ORDER — ACETAMINOPHEN 500 MG
2000 TABLET ORAL DAILY
COMMUNITY
Start: 2020-08-19

## 2025-02-20 RX ORDER — METOPROLOL SUCCINATE 100 MG/1
100 TABLET, EXTENDED RELEASE ORAL DAILY
Qty: 90 TABLET | Refills: 1 | Status: SHIPPED | OUTPATIENT
Start: 2025-02-20

## 2025-02-20 ASSESSMENT — ENCOUNTER SYMPTOMS
OCCASIONAL FEELINGS OF UNSTEADINESS: 0
DEPRESSION: 0

## 2025-02-20 ASSESSMENT — ACTIVITIES OF DAILY LIVING (ADL)
BATHING: INDEPENDENT
MANAGING_FINANCES: INDEPENDENT
GROCERY_SHOPPING: INDEPENDENT
DRESSING: INDEPENDENT
TAKING_MEDICATION: INDEPENDENT
DOING_HOUSEWORK: INDEPENDENT

## 2025-02-20 ASSESSMENT — PATIENT HEALTH QUESTIONNAIRE - PHQ9
SUM OF ALL RESPONSES TO PHQ9 QUESTIONS 1 AND 2: 0
4. FEELING TIRED OR HAVING LITTLE ENERGY: SEVERAL DAYS
2. FEELING DOWN, DEPRESSED OR HOPELESS: NOT AT ALL
1. LITTLE INTEREST OR PLEASURE IN DOING THINGS: NOT AT ALL

## 2025-02-21 LAB
ALBUMIN SERPL-MCNC: 4.4 G/DL (ref 3.6–5.1)
ALP SERPL-CCNC: 86 U/L (ref 37–153)
ALT SERPL-CCNC: 15 U/L (ref 6–29)
ANION GAP SERPL CALCULATED.4IONS-SCNC: 8 MMOL/L (CALC) (ref 7–17)
AST SERPL-CCNC: 17 U/L (ref 10–35)
BILIRUB SERPL-MCNC: 0.6 MG/DL (ref 0.2–1.2)
BUN SERPL-MCNC: 11 MG/DL (ref 7–25)
CALCIUM SERPL-MCNC: 9.4 MG/DL (ref 8.6–10.4)
CHLORIDE SERPL-SCNC: 102 MMOL/L (ref 98–110)
CHOLEST SERPL-MCNC: 158 MG/DL
CHOLEST/HDLC SERPL: 2.9 (CALC)
CO2 SERPL-SCNC: 27 MMOL/L (ref 20–32)
CREAT SERPL-MCNC: 0.57 MG/DL (ref 0.6–1)
EGFRCR SERPLBLD CKD-EPI 2021: 98 ML/MIN/1.73M2
ERYTHROCYTE [DISTWIDTH] IN BLOOD BY AUTOMATED COUNT: 13 % (ref 11–15)
GLUCOSE SERPL-MCNC: 84 MG/DL (ref 65–99)
HCT VFR BLD AUTO: 41.5 % (ref 35–45)
HDLC SERPL-MCNC: 54 MG/DL
HGB BLD-MCNC: 13.8 G/DL (ref 11.7–15.5)
LDLC SERPL CALC-MCNC: 87 MG/DL (CALC)
MCH RBC QN AUTO: 29.7 PG (ref 27–33)
MCHC RBC AUTO-ENTMCNC: 33.3 G/DL (ref 32–36)
MCV RBC AUTO: 89.4 FL (ref 80–100)
NONHDLC SERPL-MCNC: 104 MG/DL (CALC)
PLATELET # BLD AUTO: 376 THOUSAND/UL (ref 140–400)
PMV BLD REES-ECKER: 9.2 FL (ref 7.5–12.5)
POTASSIUM SERPL-SCNC: 4.6 MMOL/L (ref 3.5–5.3)
PROT SERPL-MCNC: 7 G/DL (ref 6.1–8.1)
RBC # BLD AUTO: 4.64 MILLION/UL (ref 3.8–5.1)
SODIUM SERPL-SCNC: 137 MMOL/L (ref 135–146)
TRIGL SERPL-MCNC: 76 MG/DL
WBC # BLD AUTO: 9.8 THOUSAND/UL (ref 3.8–10.8)

## 2025-02-28 ENCOUNTER — EVALUATION (OUTPATIENT)
Dept: PHYSICAL THERAPY | Facility: CLINIC | Age: 70
End: 2025-02-28
Payer: MEDICARE

## 2025-02-28 DIAGNOSIS — M25.562 ACUTE PAIN OF LEFT KNEE: ICD-10-CM

## 2025-02-28 PROCEDURE — 97161 PT EVAL LOW COMPLEX 20 MIN: CPT | Mod: GP

## 2025-02-28 PROCEDURE — 97110 THERAPEUTIC EXERCISES: CPT | Mod: GP

## 2025-02-28 ASSESSMENT — ENCOUNTER SYMPTOMS
OCCASIONAL FEELINGS OF UNSTEADINESS: 0
DEPRESSION: 0
LOSS OF SENSATION IN FEET: 0

## 2025-02-28 NOTE — LETTER
February 28, 2025    Petra Leung DO  [address] [facility], [facility] [address]  [place] [address]    Patient: Milan Xavier   YOB: 1955   Date of Visit: 2/28/2025       Dear Petra Leung DO  [address] [facility], [facility] [address]  [place] [address]    The attached plan of care is being sent to you because your patient’s medical reimbursement requires that you certify the plan of care. Your signature is required to allow uninterrupted insurance coverage.      You may indicate your approval by signing below and faxing this form back to us at Dept Fax: [phone].    Please call Dept: [phone] with any questions or concerns.    Thank you for this referral,        Anabel Pantoja, PT  [facility] [place] [address] [facility]  [address] [facility] [place] [address]    Payer: Payor: AET MEDICARE / Plan: AET MEDICARE VALUE PLAN / Product Type: *No Product type* /                                                                         Date:     Dear Anabel Pantoja, PT,     Re: Ms. Milan Xavier, MRN:[id number]    I certify that I have reviewed the attached plan of care and it is medically necessary for Ms. Milan Xavier (1955) who is under my care.          ______________________________________                    _________________  Provider name and credentials                                           Date and time                                                                                           Plan of Care 2/28/25   Effective from: 2/28/2025  Effective to: 5/28/2025    Plan ID: [id number]                Participants as of Finalize on 2/28/2025      Name Type Comments Contact Info    Petra Leung DO PCP - [employer] PCP  [phone]    Anabel Pantoja, PT Physical Therapist  [phone]           Last Plan Note       Author: Anabel Pantoja PT Status: Incomplete Last edited: 2/28/2025 10:15 AM        "  Physical Therapy Evaluation    Patient Name: Milan Xavier  MRN: [id number]  Today's Date: 2025  Visit: 1/mn  1  Insurance: [employer]  Referred by: Dr. Leung  Date of onset: 1/15/25       Diagnosis:   1. Acute pain of left knee  Referral to Physical Therapy    Follow Up In Physical Therapy        Problem List Items Addressed This Visit    None  Visit Diagnoses         Codes    Acute pain of left knee     M25.562    Relevant Orders    Follow Up In Physical Therapy            PRECAUTIONS:    Fall risk    SUBJECTIVE:   Getting up from desk to leave (stood and twisted) and has had knee pain ever since. Pain first thing in the morning and walking, walking on toes feels better than striking through the heel.     Pt goals: \" less pain especially with walking\"    Pain:   Current at rest: 0/10  Pain with walkin-6/10  Tender to touch mid anterior knee palpating jt line.    Home Living:   Split level home with      Prior level of function:   Walking 15-20 minutes.    OBJECTIVE:      Hip Strength: MMT Right Left   Flexion 5/5 4/5   Extension 3+/5 3+/5   Abduction 4/5 4/5   Adduction 4+/5 4+/5   External Rotation /5 /5   Internal Rotation /5 /5     *denotes pain with motion or muscle testing        Knee AROM: (degrees) Right Left   Flexion 148 145 p end range   Extension 0 -3       Knee Strength: MMT Right Left   Flexion 5/5 4-/5   Extension 5/5 3-/5 apprehensive     *denotes pain with motion or muscle testing    Swelling/Girth: moderate swelling around patella and anterior L Knee.    Gait: small strides, cautious gait x 100 ft no ad.    Observation: end range flexion L knee pain.           Outcome Measure:    Other Measures  Lower Extremity Funtional Score (LEFS): 54 (54/80, 67.5)    ASSESSMENT:  Pt presents with pain in L anterior knee x 1 month after twisting on the knee. Pt with mild OA on x-ray. Pt signs and symptoms consistent with a meniscus tear. Pt has difficulty walking, pain with end range " flexion greater than extension, fear with quad muscle activation. She will benefit from physical therapy to improve pain, strength, motion and gait.    Clinical presentation:  Stable and/or uncomplicated characteristics,        Complexity:   . Low complexity due to patient's clinical presentation being stable and uncomplicated by any significant comorbidities that may affect rehab tolerance and progression.     Problems:  Problems to be addressed: ADL impairment, Pain, ROM impaired, Mobility impaired , Balance impaired, and Gait impaired strength.    Personal factors effecting care: n/a    TREATMENT:  - Therex:  Med bridge access code: [email or web address]  Seated long arch quad x 15  Standing hip abduction x 15-20  Standing hip extension at counter x 15-20                 PATIENT EDUCATION:  Edu on meniscus anatomy and healing rates. Answered pt questions about cortizone injections.       PLAN:      Rehab potential: good  Frequency and Duration:  Recommend 1-2 x wk x 6-8 weeks .  Physical therapy will include: therapeutic exercise, therapeutic activity, manual, neuromuscular re-education, gait, ultrasound, electrical stimulation.    Plan of care agreement:  Plan of care was developed with input and agreement by the patient. Yes    GOALS:  Active       PT Problem       Pt will have improved LEFs score by 10% or greater  indicating improving Le function       Start:  02/28/25    Expected End:  05/28/25            Pt will have improved LE strength in hips and knees to 4+/5 all or greater for improved LE stability and stairs.       Start:  02/28/25    Expected End:  05/28/25            Pt will have improved knee extension to 0 degrees with no increased pain/discomfort.       Start:  02/28/25    Expected End:  03/28/25            Pt will have improved walking x 20 minutes with no increased knee pain x 1 wk or greater.       Start:  02/28/25    Expected End:  03/28/25            Pt will demonstrate reciprocal stair negotiation x 1  flight and 1 rail no increased pain.       Start:  02/28/25    Expected End:  05/28/25                   Current Participants as of 2/28/2025      Name Type Comments Contact Info    Petra Leung DO PCP - [employer] PCP  [phone]    Signature pending    Anabel Pantoja, PT Physical Therapist  [phone]

## 2025-02-28 NOTE — PROGRESS NOTES
"Physical Therapy Evaluation    Patient Name: Milan Xavier  MRN: [id number]  Today's Date: 2025  Visit: 1/mn  1  Insurance: [employer]  Referred by: Dr. Leung  Date of onset: 1/15/25    Time Calculation  Start Time: 1020  Stop Time: 1110  Time Calculation (min): 50 min  Diagnosis:   1. Acute pain of left knee  Referral to Physical Therapy    Follow Up In Physical Therapy        Problem List Items Addressed This Visit    None  Visit Diagnoses         Codes    Acute pain of left knee     M25.562    Relevant Orders    Follow Up In Physical Therapy            PRECAUTIONS:    Fall risk    SUBJECTIVE:   Getting up from desk to leave (stood and twisted) and has had knee pain ever since. Pain first thing in the morning and walking, walking on toes feels better than striking through the heel.     Pt goals: \" less pain especially with walking\"    Pain:   Current at rest: 0/10  Pain with walkin-6/10  Tender to touch mid anterior knee palpating jt line.    Home Living:   Split level home with      Prior level of function:   Walking 15-20 minutes.    OBJECTIVE:      Hip Strength: MMT Right Left   Flexion 5/5 4/5   Extension 3+/5 3+/5   Abduction 4/5 4/5   Adduction 4+/5 4+/5   External Rotation /5 /5   Internal Rotation /5 /5     *denotes pain with motion or muscle testing        Knee AROM: (degrees) Right Left   Flexion 148 145 p end range   Extension 0 -3       Knee Strength: MMT Right Left   Flexion 5/5 4-/5   Extension 5/5 3-/5 apprehensive     *denotes pain with motion or muscle testing    Swelling/Girth: moderate swelling around patella and anterior L Knee.    Gait: small strides, cautious gait x 100 ft no ad.    Observation: end range flexion L knee pain.           Outcome Measure:    Other Measures  Lower Extremity Funtional Score (LEFS): 54 (54/80, 67.5)    ASSESSMENT:  Pt presents with pain in L anterior knee x 1 month after twisting on the knee. Pt with mild OA on x-ray. Pt signs and symptoms " consistent with a meniscus tear. Pt has difficulty walking, pain with end range flexion greater than extension, fear with quad muscle activation. She will benefit from physical therapy to improve pain, strength, motion and gait.    Clinical presentation:  Stable and/or uncomplicated characteristics,        Complexity:   . Low complexity due to patient's clinical presentation being stable and uncomplicated by any significant comorbidities that may affect rehab tolerance and progression.     Problems:  Problems to be addressed: ADL impairment, Pain, ROM impaired, Mobility impaired , Balance impaired, and Gait impaired strength.    Personal factors effecting care: n/a    TREATMENT:  - Therex:  Med bridge access code: [email or web address]  Seated long arch quad x 15  Standing hip abduction x 15-20  Standing hip extension at counter x 15-20                 PATIENT EDUCATION:  Edu on meniscus anatomy and healing rates. Answered pt questions about cortizone injections.       PLAN:      Rehab potential: good  Frequency and Duration:  Recommend 1-2 x wk x 6-8 weeks .  Physical therapy will include: therapeutic exercise, therapeutic activity, manual, neuromuscular re-education, gait, ultrasound, electrical stimulation.    Plan of care agreement:  Plan of care was developed with input and agreement by the patient. Yes    GOALS:  Active       PT Problem       Pt will have improved LEFs score by 10% or greater  indicating improving Le function       Start:  02/28/25    Expected End:  05/28/25            Pt will have improved LE strength in hips and knees to 4+/5 all or greater for improved LE stability and stairs.       Start:  02/28/25    Expected End:  05/28/25            Pt will have improved knee extension to 0 degrees with no increased pain/discomfort.       Start:  02/28/25    Expected End:  03/28/25            Pt will have improved walking x 20 minutes with no increased knee pain x 1 wk or greater.       Start:  02/28/25    Expected  End:  03/28/25            Pt will demonstrate reciprocal stair negotiation x 1 flight and 1 rail no increased pain.       Start:  02/28/25    Expected End:  05/28/25

## 2025-03-20 ENCOUNTER — TREATMENT (OUTPATIENT)
Dept: PHYSICAL THERAPY | Facility: CLINIC | Age: 70
End: 2025-03-20
Payer: MEDICARE

## 2025-03-20 DIAGNOSIS — M25.562 ACUTE PAIN OF LEFT KNEE: ICD-10-CM

## 2025-03-20 PROCEDURE — 97110 THERAPEUTIC EXERCISES: CPT | Mod: GP

## 2025-03-20 NOTE — PROGRESS NOTES
"  Physical Therapy Treatment    Patient Name: Milan Xavier  MRN: [id number]  Today's Date: 3/20/2025   Visit   2  Insurance: [employer]  Referred by: Dr. Leung  Date of onset: 1/15/25    Time Calculation  Start Time: 1620  Stop Time: 1705  Time Calculation (min): 45 min  Diagnosis:   1. Acute pain of left knee  Follow Up In Physical Therapy        Problem List Items Addressed This Visit    None  Visit Diagnoses         Codes    Acute pain of left knee     M25.562               PT Therapeutic Procedures Time Entry  Therapeutic Exercise Time Entry: 45                 Precautions:  Fall risk     SUBJECTIVE:  Stopped walking on her toe, feels like she is walking her normal speed. Feels more pain with cold weather.   Pain level: 0/10   Stiff and audible cracking/clicking.    HEP compliance: good    OBJECTIVE:  L knee 0 deg extension.  Swelling mild to moderate  Mild warmth L LE    Single leg stance:   L LE 24 seconds  R LE 30 seconds    Treatment:         - Therapeutic Exercise:   Med bridge access code: [email or web address]  Seated long arch quad 2x10 1.5 lbs  Standing hip abduction 2x10 1.5 lbs  Standing hip extension at counter 2 x 10 1.5 lbs  Standing hip flex, straight leg x 15 each  Hamstring stretch on bed      - Neuromuscular Re-education:   Single leg stand 30\"x2    - Gait Train:  Amb 100 ft x 1 no assistive device    ASSESSMENT:  Pt presents with pain in L anterior knee x 1 month after twisting on the knee. Pt walking improved with heel to toe gait. Pt knee extension improved to 0 degrees. Swelling still present.      PLAN:  Progress balance on L LE.      GOALS:  Active       PT Problem       Pt will have improved LEFs score by 10% or greater  indicating improving Le function       Start:  02/28/25    Expected End:  05/28/25            Pt will have improved LE strength in hips and knees to 4+/5 all or greater for improved LE stability and stairs.       Start:  02/28/25    Expected End:  05/28/25            Pt will " have improved knee extension to 0 degrees with no increased pain/discomfort.       Start:  02/28/25    Expected End:  03/28/25            Pt will have improved walking x 20 minutes with no increased knee pain x 1 wk or greater.       Start:  02/28/25    Expected End:  03/28/25            Pt will demonstrate reciprocal stair negotiation x 1 flight and 1 rail no increased pain.       Start:  02/28/25    Expected End:  05/28/25

## 2025-03-27 ENCOUNTER — APPOINTMENT (OUTPATIENT)
Dept: PHYSICAL THERAPY | Facility: CLINIC | Age: 70
End: 2025-03-27
Payer: MEDICARE

## 2025-03-30 DIAGNOSIS — I10 PRIMARY HYPERTENSION: ICD-10-CM

## 2025-03-31 RX ORDER — METOPROLOL SUCCINATE 100 MG/1
100 TABLET, EXTENDED RELEASE ORAL DAILY
Qty: 90 TABLET | Refills: 0 | Status: SHIPPED | OUTPATIENT
Start: 2025-03-31

## 2025-04-01 ENCOUNTER — TREATMENT (OUTPATIENT)
Dept: PHYSICAL THERAPY | Facility: CLINIC | Age: 70
End: 2025-04-01
Payer: MEDICARE

## 2025-04-01 DIAGNOSIS — M25.562 ACUTE PAIN OF LEFT KNEE: ICD-10-CM

## 2025-04-01 PROCEDURE — 97110 THERAPEUTIC EXERCISES: CPT | Mod: GP

## 2025-04-01 NOTE — PROGRESS NOTES
"  Physical Therapy Treatment/ Discharge    Patient Name: Milan Xavier  MRN: [id number]  Today's Date: 4/1/2025   Visit   3  Insurance: [employer]  Referred by: Dr. Leung  Date of onset: 1/15/25  Time Calculation  Start Time: 1645  Stop Time: 1725  Time Calculation (min): 40 min  Diagnosis:   1. Acute pain of left knee  Follow Up In Physical Therapy        Problem List Items Addressed This Visit    None  Visit Diagnoses         Codes    Acute pain of left knee     M25.562               PT Therapeutic Procedures Time Entry  Therapeutic Exercise Time Entry: 40                 Precautions:  Fall risk    SUBJECTIVE:  Notes no pain in a week in the L knee. Notes she is walking faster and not limping.  Pain level: 0/10 in L knee.    HEP compliance: good    OBJECTIVE:     Hip Strength: MMT Right Left   Flexion 5/5 4/5   Extension 3+/5 3+/5   Abduction 4/5 4+/5   Adduction 4+/5 4+/5   External Rotation /5 /5   Internal Rotation /5 /5      *denotes pain with motion or muscle testing           Knee AROM: (degrees) Right Left   Flexion 148 145    Extension 0 0         Knee Strength: MMT Right Left   Flexion 5/5 5/5   Extension 5/5 4+/5 apprehensive     SLS  L 30 sec  R 21 sec    -Outcome Measure:    Other Measures  Lower Extremity Funtional Score (LEFS): 64 (64/80, 80%)    Treatment:         - Therapeutic Exercise:    Med bridge access code: [email or web address]  Seated long arch quad 2x10 1.5 lbs  Standing hip abduction 2x10 1.5 lbs  Standing hip extension at counter 2 x 10 1.5 lbs  Standing hip flex, straight leg x 15 each  Hamstring stretch on bed 30\" x 3  Mini squat x 15   Re-assessed goals     - Neuromuscular Re-education:   Single leg stand 30\"x2 each LE         ASSESSMENT:  Pt presents with pain in L anterior knee x 1 month after twisting on the knee. Pt demonstrates pain free ROM, walking, stairs and single leg stand. Pt knee strength improving and hip strength weaknesses pointed out for continued progress. Pt is " independent with HEP. Pt with no further needs and agreeable to discharge.    PLAN:  discharge      GOALS:  Active       PT Problem       Pt will have improved LEFs score by 10% or greater  indicating improving Le function (Met)       Start:  02/28/25    Expected End:  05/28/25    Resolved:  04/01/25         Pt will have improved LE strength in hips and knees to 4+/5 all or greater for improved LE stability and stairs. (Progressing)       Start:  02/28/25    Expected End:  05/28/25            Pt will have improved knee extension to 0 degrees with no increased pain/discomfort. (Met)       Start:  02/28/25    Expected End:  03/28/25    Resolved:  04/01/25         Pt will have improved walking x 20 minutes with no increased knee pain x 1 wk or greater. (Met)       Start:  02/28/25    Expected End:  03/28/25    Resolved:  04/01/25         Pt will demonstrate reciprocal stair negotiation x 1 flight and 1 rail no increased pain. (Met)       Start:  02/28/25    Expected End:  05/28/25    Resolved:  04/01/25

## 2025-04-03 ENCOUNTER — APPOINTMENT (OUTPATIENT)
Dept: PHYSICAL THERAPY | Facility: CLINIC | Age: 70
End: 2025-04-03
Payer: MEDICARE

## 2025-04-08 DIAGNOSIS — E78.5 HYPERLIPIDEMIA, UNSPECIFIED HYPERLIPIDEMIA TYPE: ICD-10-CM

## 2025-04-08 RX ORDER — ATORVASTATIN CALCIUM 10 MG/1
10 TABLET, FILM COATED ORAL DAILY
Qty: 90 TABLET | Refills: 0 | Status: SHIPPED | OUTPATIENT
Start: 2025-04-08

## 2025-05-20 ENCOUNTER — APPOINTMENT (OUTPATIENT)
Dept: PRIMARY CARE | Facility: CLINIC | Age: 70
End: 2025-05-20
Payer: MEDICARE

## 2025-07-09 DIAGNOSIS — E78.5 HYPERLIPIDEMIA, UNSPECIFIED HYPERLIPIDEMIA TYPE: ICD-10-CM

## 2025-07-11 RX ORDER — ATORVASTATIN CALCIUM 10 MG/1
10 TABLET, FILM COATED ORAL DAILY
Qty: 90 TABLET | Refills: 0 | Status: SHIPPED | OUTPATIENT
Start: 2025-07-11

## 2025-09-24 ENCOUNTER — APPOINTMENT (OUTPATIENT)
Dept: ORTHOPEDIC SURGERY | Facility: CLINIC | Age: 70
End: 2025-09-24
Payer: MEDICARE